# Patient Record
Sex: FEMALE | Race: OTHER | NOT HISPANIC OR LATINO | URBAN - METROPOLITAN AREA
[De-identification: names, ages, dates, MRNs, and addresses within clinical notes are randomized per-mention and may not be internally consistent; named-entity substitution may affect disease eponyms.]

---

## 2020-09-29 ENCOUNTER — EMERGENCY (EMERGENCY)
Facility: HOSPITAL | Age: 55
LOS: 1 days | Discharge: HOME | End: 2020-09-29
Attending: EMERGENCY MEDICINE | Admitting: HOSPITALIST
Payer: COMMERCIAL

## 2020-09-29 VITALS
RESPIRATION RATE: 16 BRPM | DIASTOLIC BLOOD PRESSURE: 108 MMHG | TEMPERATURE: 98 F | OXYGEN SATURATION: 100 % | SYSTOLIC BLOOD PRESSURE: 136 MMHG | HEART RATE: 86 BPM

## 2020-09-29 DIAGNOSIS — R21 RASH AND OTHER NONSPECIFIC SKIN ERUPTION: ICD-10-CM

## 2020-09-29 LAB
ALBUMIN SERPL ELPH-MCNC: 4.3 G/DL — SIGNIFICANT CHANGE UP (ref 3.3–5)
ALP SERPL-CCNC: 72 U/L — SIGNIFICANT CHANGE UP (ref 40–120)
ALT FLD-CCNC: 17 U/L — SIGNIFICANT CHANGE UP (ref 4–33)
ANION GAP SERPL CALC-SCNC: 10 MMO/L — SIGNIFICANT CHANGE UP (ref 7–14)
AST SERPL-CCNC: 16 U/L — SIGNIFICANT CHANGE UP (ref 4–32)
BASOPHILS # BLD AUTO: 0.05 K/UL — SIGNIFICANT CHANGE UP (ref 0–0.2)
BASOPHILS NFR BLD AUTO: 0.7 % — SIGNIFICANT CHANGE UP (ref 0–2)
BILIRUB SERPL-MCNC: 0.2 MG/DL — SIGNIFICANT CHANGE UP (ref 0.2–1.2)
BUN SERPL-MCNC: 13 MG/DL — SIGNIFICANT CHANGE UP (ref 7–23)
CALCIUM SERPL-MCNC: 9.8 MG/DL — SIGNIFICANT CHANGE UP (ref 8.4–10.5)
CHLORIDE SERPL-SCNC: 102 MMOL/L — SIGNIFICANT CHANGE UP (ref 98–107)
CO2 SERPL-SCNC: 28 MMOL/L — SIGNIFICANT CHANGE UP (ref 22–31)
CREAT SERPL-MCNC: 0.72 MG/DL — SIGNIFICANT CHANGE UP (ref 0.5–1.3)
CRP SERPL-MCNC: 5 MG/L — SIGNIFICANT CHANGE UP
EOSINOPHIL # BLD AUTO: 0.5 K/UL — SIGNIFICANT CHANGE UP (ref 0–0.5)
EOSINOPHIL NFR BLD AUTO: 7.1 % — HIGH (ref 0–6)
ERYTHROCYTE [SEDIMENTATION RATE] IN BLOOD: 14 MM/HR — SIGNIFICANT CHANGE UP (ref 4–25)
GLUCOSE SERPL-MCNC: 97 MG/DL — SIGNIFICANT CHANGE UP (ref 70–99)
HCT VFR BLD CALC: 42.5 % — SIGNIFICANT CHANGE UP (ref 34.5–45)
HGB BLD-MCNC: 13.6 G/DL — SIGNIFICANT CHANGE UP (ref 11.5–15.5)
IMM GRANULOCYTES NFR BLD AUTO: 0.1 % — SIGNIFICANT CHANGE UP (ref 0–1.5)
LYMPHOCYTES # BLD AUTO: 1.81 K/UL — SIGNIFICANT CHANGE UP (ref 1–3.3)
LYMPHOCYTES # BLD AUTO: 25.7 % — SIGNIFICANT CHANGE UP (ref 13–44)
MCHC RBC-ENTMCNC: 28.6 PG — SIGNIFICANT CHANGE UP (ref 27–34)
MCHC RBC-ENTMCNC: 32 % — SIGNIFICANT CHANGE UP (ref 32–36)
MCV RBC AUTO: 89.5 FL — SIGNIFICANT CHANGE UP (ref 80–100)
MONOCYTES # BLD AUTO: 0.36 K/UL — SIGNIFICANT CHANGE UP (ref 0–0.9)
MONOCYTES NFR BLD AUTO: 5.1 % — SIGNIFICANT CHANGE UP (ref 2–14)
NEUTROPHILS # BLD AUTO: 4.31 K/UL — SIGNIFICANT CHANGE UP (ref 1.8–7.4)
NEUTROPHILS NFR BLD AUTO: 61.3 % — SIGNIFICANT CHANGE UP (ref 43–77)
NRBC # FLD: 0 K/UL — SIGNIFICANT CHANGE UP (ref 0–0)
PLATELET # BLD AUTO: 196 K/UL — SIGNIFICANT CHANGE UP (ref 150–400)
PMV BLD: 10.9 FL — SIGNIFICANT CHANGE UP (ref 7–13)
POTASSIUM SERPL-MCNC: 4.2 MMOL/L — SIGNIFICANT CHANGE UP (ref 3.5–5.3)
POTASSIUM SERPL-SCNC: 4.2 MMOL/L — SIGNIFICANT CHANGE UP (ref 3.5–5.3)
PROT SERPL-MCNC: 7.4 G/DL — SIGNIFICANT CHANGE UP (ref 6–8.3)
RBC # BLD: 4.75 M/UL — SIGNIFICANT CHANGE UP (ref 3.8–5.2)
RBC # FLD: 13.1 % — SIGNIFICANT CHANGE UP (ref 10.3–14.5)
SODIUM SERPL-SCNC: 140 MMOL/L — SIGNIFICANT CHANGE UP (ref 135–145)
WBC # BLD: 7.04 K/UL — SIGNIFICANT CHANGE UP (ref 3.8–10.5)
WBC # FLD AUTO: 7.04 K/UL — SIGNIFICANT CHANGE UP (ref 3.8–10.5)

## 2020-09-29 PROCEDURE — 99283 EMERGENCY DEPT VISIT LOW MDM: CPT

## 2020-09-29 RX ORDER — ACETAMINOPHEN 500 MG
650 TABLET ORAL ONCE
Refills: 0 | Status: COMPLETED | OUTPATIENT
Start: 2020-09-29 | End: 2020-09-29

## 2020-09-29 RX ADMIN — Medication 650 MILLIGRAM(S): at 18:50

## 2020-09-29 NOTE — ED PROVIDER NOTE - PROGRESS NOTE DETAILS
Pt had been admitted and endorsed to hospitalist and MAR, but then chose to leave . She that she wants to come back tomorrow AM.  I informed her that her rash could potentially get worse and that she is an infectious risk.  She stated she has no immunocompromised close contacts and is able to isolate at home.  Understands and accepts risks of worsening including fevers, deformity, disability, death.  Is able to ensure adequate follow up. Appears to possess decisional capacity at this time.

## 2020-09-29 NOTE — ED PROVIDER NOTE - PHYSICAL EXAMINATION
Radha Jackman MD  GENERAL: Patient awake alert NAD.  HEENT: NC/AT, Moist mucous membranes, PERRL, EOMI.  LUNGS: CTAB, no wheezes or crackles.   CARDIAC: RRR, no m/r/g.    ABDOMEN: Soft, NT, ND, No rebound, guarding. No CVA tenderness.   EXT: No edema. No calf tenderness. CV 2+DP/PT bilaterally.   MSK: No spinal tenderness, no pain with movement, no deformities.  NEURO: A&Ox3. Moving all extremities.  SKIN: Warm and dry. +rash on face, ears, shoulders, back, R elbow, buttocks. Rash is vesicular in various stages -- macules, papules, vesicles, and crusted over vesicles. They are not on a red base. She has one small 2mm bullae on her elbow.  PSYCH: Normal affect. Radha Jackman MD  GENERAL: Patient awake alert NAD.  HEENT: NC/AT, Moist mucous membranes, PERRL, EOMI.  LUNGS: CTAB, no wheezes or crackles.   CARDIAC: RRR, no m/r/g.    ABDOMEN: Soft, NT, ND, No rebound, guarding. No CVA tenderness.   EXT: No edema. No calf tenderness. CV 2+DP/PT bilaterally.   MSK: No spinal tenderness, no pain with movement, no deformities.  NEURO: A&Ox3. Moving all extremities.  SKIN: Warm and dry. +rash on face, ears, shoulders, back, R elbow, buttocks. Rash is vesicular in various stages -- macules, papules, vesicles, and crusted over vesicles. They are not on a red base. She has one small 2mm pustule on her RIGHT elbow, near punctate biopsy site (done today at her derm).  PSYCH: Normal affect.

## 2020-09-29 NOTE — ED PROVIDER NOTE - OBJECTIVE STATEMENT
Pt is a 56 yo F who presents with 6 weeks of rash, acutely worsening over past couple weeks, sent in by her PCP concerned for disseminated zoster vs varicella.  Symptoms started 6 weeks ago with itching on the face. She was doing dry needling at the time and thought it was due to that. When symptoms got worse and rash started she went to pcp and was prescribed several creams which did not help. Rash has spread to shoulders, ears, back, buttocks, elbows. It starts as vesicles that then crust over. They are not painful. Her vision is not affected, nor hearing. Notes mild headache. Pt is a 54 yo F who presents with 6 weeks of pruritic rash, acutely worsening over past couple weeks, sent in by her Dermatologist, who has been trying to manage this, concerned for disseminated zoster vs primary varicella (does not know if she ever had chicken pox).  Symptoms started 6 weeks ago with itching on the face. She was doing dry needling at the time and thought it was due to that. When symptoms got worse and rash started she went to pcp, and then to derm, and was prescribed several creams which did not help. has copmleted 6 week course of Doxycycline. Rash has spread to shoulders, ears, back, buttocks, elbows. It starts as vesicles that then crust over. They are not painful. Her vision is not affected, nor hearing. Notes mild headache.    Derm = Dr. Gil Santiago [phone]

## 2020-09-29 NOTE — ED ADULT NURSE NOTE - OBJECTIVE STATEMENT
pt received at intake rm 12 AAO x 3. pt reports she was sent by her dermatologist for skin rash to her face, shoulders, and elbow. pt reports "rash" began several weeks ago. pt c/o itchiness and headache. pt noted to have bumps to face, ears, left shoulder, right elbow. bumps appear to be dry scabs, no drainage noted. pt denies sob, chest pain, n/v/d, fevers, chills, cough. respirations even and unlabored. 20g iv placed to left ac.

## 2020-09-29 NOTE — ED PROVIDER NOTE - CLINICAL SUMMARY MEDICAL DECISION MAKING FREE TEXT BOX
Augustina: pt has rash concerning for drug reaction vs disseminated zoster vs varicella. Pt has rash concerning for drug reaction vs disseminated zoster vs varicella.  Has failed outpt tx and eval by her Derm.  To be admitted for Derm and ID eval per my d/w her derm (we spoke with Derm office PA today).

## 2020-09-29 NOTE — ED PROVIDER NOTE - NS ED ROS FT
GENERAL: No fever, chills  EYES: no vision changes, no discharge.   ENT: no difficulty swallowing or speaking   CARDIAC: no chest pain/pressure, SOB, lower extremity swelling  PULMONARY: no cough, SOB  GI: no abdominal pain, n/v/d  : no dysuria  SKIN: +rash  NEURO: no headache, lightheadedness, paresthesia  MSK: No joint pain, myalgia, weakness.

## 2020-09-29 NOTE — ED ADULT TRIAGE NOTE - CHIEF COMPLAINT QUOTE
Pt c/o rash to face and back that has worsened over 6 weeks. Sent in by MD for vesicles tp B/L ears, face, back and R elbow as a r/o disseminated shingles v varicella. Pt reports she has a HA and is itchy

## 2020-09-29 NOTE — ED PROVIDER NOTE - NSFOLLOWUPINSTRUCTIONS_ED_ALL_ED_FT
YOU ARE LEAVING THE HOSPITAL AGAINST A RECOMMENDATION FOR ADMISSION    PLEASE RETURN AS SOON AS POSSIBLE TO CONTINUE EVALUATION SINCE YOUR RASH COULD GET MUCH WORSE AND LEAD TO DEFORMITY, DISABILITY, OR SERIOUS INFECTION. YOU WERE TO SEE AN INFECTIOUS DISEASES DOCTOR AND A DERMATOLOGIST TOMORROW.     AVOID PHYSICAL CONTACT WITH OTHERS AND, IN PARTICULAR, ISOLATE YOURSELF FROM PEOPLE WITH WEAKENED IMMUNE SYSTEMS SUCH AS THE VERY OLD OR VERY YOUNG OR THOSE ON CHEMOTHERAPY.

## 2020-09-29 NOTE — ED PROVIDER NOTE - ATTENDING CONTRIBUTION TO CARE
Attending Attestation: Dr. Jurado  I have personally performed a history and physical examination of the patient and discussed management with the resident as well as the patient.  I reviewed the resident's note and agree with the documented findings and plan of care.  I have authored and modified critical sections of the Provider Note, including but not limited to HPI, Physical Exam and MDM. Pt has rash concerning for drug reaction vs disseminated zoster vs varicella.  Has failed outpt tx and eval by her Derm.  To be admitted for Derm and ID eval per my d/w her derm (we spoke with Derm office PA today).

## 2020-09-29 NOTE — ED PROVIDER NOTE - PATIENT PORTAL LINK FT
You can access the FollowMyHealth Patient Portal offered by [facility] by registering at the following website: [email or web address]. By joining [employer]’s FollowMyHealth portal, you will also be able to view your health information using other applications (apps) compatible with our system.

## 2020-09-30 ENCOUNTER — EMERGENCY (EMERGENCY)
Facility: HOSPITAL | Age: 55
LOS: 1 days | Discharge: HOME | End: 2020-09-30
Attending: EMERGENCY MEDICINE | Admitting: EMERGENCY MEDICINE
Payer: COMMERCIAL

## 2020-09-30 VITALS
HEART RATE: 96 BPM | TEMPERATURE: 98 F | OXYGEN SATURATION: 100 % | SYSTOLIC BLOOD PRESSURE: 121 MMHG | DIASTOLIC BLOOD PRESSURE: 81 MMHG | RESPIRATION RATE: 16 BRPM

## 2020-09-30 LAB — SARS-COV-2 RNA SPEC QL NAA+PROBE: SIGNIFICANT CHANGE UP

## 2020-09-30 PROCEDURE — 99244 OFF/OP CNSLTJ NEW/EST MOD 40: CPT | Mod: GC

## 2020-09-30 PROCEDURE — 99283 EMERGENCY DEPT VISIT LOW MDM: CPT

## 2020-09-30 NOTE — ED PROVIDER NOTE - PHYSICAL EXAMINATION
GENERAL: Patient awake alert NAD.  NEURO: A&Ox3. Moving all extremities.  SKIN: Vesicular rashes in multiple stages of healing that are crusted over on b/l upper back, shoulders, periorbital regions, right ear, b/l cheeks, b/l buttocks.  PSYCH: Normal affect.

## 2020-09-30 NOTE — ED ADULT TRIAGE NOTE - CHIEF COMPLAINT QUOTE
Pt c/o disseminated rash to upper back and face. States she was here yesterday and was "told to come back to ER today to see an infectious disease doctor". Denies CP, SOB, fever.

## 2020-09-30 NOTE — ED PROVIDER NOTE - NS ED ROS FT
General: denies fever, chills, weight loss/weight gain.  HENT: denies nasal congestion, sore throat, rhinorrhea, ear pain.  Eyes: denies visual changes, blurred vision, eye discharge, eye redness.  Neck: denies neck pain, neck swelling.  CV: denies chest pain, palpitations.  Resp: denies difficulty breathing, cough.  Abdominal: denies nausea, vomiting, diarrhea, abdominal pain, blood in stool, dark stool.  MSK: denies muscle aches, bony pain, leg pain, leg swelling.  Neuro: denies headaches, numbness, tingling, dizziness, lightheadedness.  Skin: rashes in face, shoulders, upper back, buttocks.  Hematologic: denies unexplained bruises.

## 2020-09-30 NOTE — ED PROVIDER NOTE - OBJECTIVE STATEMENT
Pt. is a 55 y.o. F p/w 6 weeks of rash on her face, upper back, b/l buttocks, right shoulder. Pt. has been following a dermatologist outpt. and has tried multiple different meds and creams which have not helped.  Pt. had a bx of one of the lesions yesterday, after which her dermatologist sent her in for concern for disseminated zoster.  Denies ever having chicken pox, non-vaccinated for varicella.  Rash is itchy.  Denies vision/hearing changes.    Derm = Dr. Gil Santiago [phone]

## 2020-09-30 NOTE — CONSULT NOTE ADULT - SUBJECTIVE AND OBJECTIVE BOX
Lily Porter - [phone]    Patient is a 55y old  Female who presents with a chief complaint of rash    HPI:  · HPI Objective Statement: Pt. is a 55 y.o. F p/w 6 weeks of rash on her face, upper back, b/l buttocks, right shoulder. Pt. has been following a dermatologist outpt. and has tried multiple different meds and creams which have not helped.  Pt. had a bx of one of the lesions yesterday, after which her dermatologist sent her in for concern for disseminated zoster.  Denies ever having chicken pox, non-vaccinated for varicella.  Rash is itchy.  Denies vision/hearing changes.    	Derm = Dr. Gil Santiago [phone]           prior hospital charts reviewed [  ]  primary team notes reviewed [  ]  other consultant notes reviewed [  ]    PAST MEDICAL & SURGICAL HISTORY:      Allergies  penicillin (Anaphylaxis)    ANTIMICROBIALS:          OTHER MEDS: MEDICATIONS  (STANDING):    SOCIAL HISTORY:       FAMILY HISTORY:    REVIEW OF SYSTEMS  [  ] ROS unobtainable because:    [  ] All other systems negative except as noted below:	    Constitutional:  [ ] fever [ ] chills  [ ] weight loss  [ ] weakness  Skin:  [ ] rash [ ] phlebitis	  Eyes: [ ] icterus [ ] pain  [ ] discharge	  ENMT: [ ] sore throat  [ ] thrush [ ] ulcers [ ] exudates  Respiratory: [ ] dyspnea [ ] hemoptysis [ ] cough [ ] sputum	  Cardiovascular:  [ ] chest pain [ ] palpitations [ ] edema	  Gastrointestinal:  [ ] nausea [ ] vomiting [ ] diarrhea [ ] constipation [ ] pain	  Genitourinary:  [ ] dysuria [ ] frequency [ ] hematuria [ ] discharge [ ] flank pain  [ ] incontinence  Musculoskeletal:  [ ] myalgias [ ] arthralgias [ ] arthritis  [ ] back pain  Neurological:  [ ] headache [ ] seizures  [ ] confusion/altered mental status  Psychiatric:  [ ] anxiety [ ] depression	  Hematology/Lymphatics:  [ ] lymphadenopathy  Endocrine:  [ ] adrenal [ ] thyroid  Allergic/Immunologic:	 [ ] transplant [ ] seasonal    Vital Signs Last 24 Hrs  T(F): 97.7 (09-30-20 @ 13:45), Max: 98 (09-29-20 @ 15:36)  Vital Signs Last 24 Hrs  HR: 96 (09-30-20 @ 13:45) (96 - 96)  BP: 121/81 (09-30-20 @ 13:45) (121/81 - 121/81)  RR: 16 (09-30-20 @ 13:45)  SpO2: 100% (09-30-20 @ 13:45) (100% - 100%)  Wt(kg): --    PHYSICAL EXAM:                              13.6   7.04  )-----------( 196      ( 29 Sep 2020 20:00 )             42.5     140  |  102  |  13  ----------------------------<  97  4.2   |  28  |  0.72    Ca    9.8      29 Sep 2020 20:00    TPro  7.4  /  Alb  4.3  /  TBili  0.2  /  DBili  x   /  AST  16  /  ALT  17  /  AlkPhos  72  09-29    MICROBIOLOGY:    RADIOLOGY:  imaging below personally reviewed and agree with findings Lily Porter - [phone]    Patient is a 55y old  Female who presents with a chief complaint of rash    HPI:  55F p/w 6 weeks of rash on her face, upper back, b/l buttocks, right shoulder. Pt. has been following a dermatologist outpt.  She has been on doxycycline and topical steroids without relief.  Biggest complaint is pruritis.  She did have an outpatient biopsy but does not know the results.      ID asked to help management but mostly to see if we think this is zoster.    prior hospital charts reviewed [ x ]  primary team notes reviewed [  ]  other consultant notes reviewed [  ]    PAST MEDICAL & SURGICAL HISTORY:  none    Allergies  penicillin (Anaphylaxis)    ANTIMICROBIALS:  hx doxycycline    MEDS:  none    SOCIAL HISTORY:   no tobacco, not currently working, was in banking    FAMILY HISTORY:  no autoimmune disease    REVIEW OF SYSTEMS  [  ] ROS unobtainable because:    [ x ] All other systems negative except as noted below:	    Constitutional:  [ ] fever [ ] chills  [ ] weight loss  [ ] weakness  Skin:  [ x] rash [ ] phlebitis	  Eyes: [ ] icterus [ ] pain  [ ] discharge	  ENMT: [ ] sore throat  [ ] thrush [ ] ulcers [ ] exudates  Respiratory: [ ] dyspnea [ ] hemoptysis [ ] cough [ ] sputum	  Cardiovascular:  [ ] chest pain [ ] palpitations [ ] edema	  Gastrointestinal:  [ ] nausea [ ] vomiting [ ] diarrhea [ ] constipation [ ] pain	  Genitourinary:  [ ] dysuria [ ] frequency [ ] hematuria [ ] discharge [ ] flank pain  [ ] incontinence  Musculoskeletal:  [ ] myalgias [ ] arthralgias [ ] arthritis  [ ] back pain  Neurological:  [ ] headache [ ] seizures  [ ] confusion/altered mental status  Psychiatric:  [ ] anxiety [ ] depression	  Hematology/Lymphatics:  [ ] lymphadenopathy  Endocrine:  [ ] adrenal [ ] thyroid  Allergic/Immunologic:	 [ ] transplant [ ] seasonal    Vital Signs Last 24 Hrs  T(F): 97.7 (09-30-20 @ 13:45), Max: 98 (09-29-20 @ 15:36)  Vital Signs Last 24 Hrs  HR: 96 (09-30-20 @ 13:45) (96 - 96)  BP: 121/81 (09-30-20 @ 13:45) (121/81 - 121/81)  RR: 16 (09-30-20 @ 13:45)  SpO2: 100% (09-30-20 @ 13:45) (100% - 100%)  Wt(kg): --    PHYSICAL EXAM:  Constitutional: non-toxic,  HEAD/EYES: anicteric  ENT:  supple, no thrush  Cardiovascular:   not tachycardic  Respiratory:  not tachypneic  GI:  soft, non-tender, normal bowel sounds  :  no haq  Musculoskeletal:  no synovitis  Neurologic: awake and alert  Skin:  mostly excoriated areas of skin, some hyperkeratotic areas elbow, patches of blisters left posterior shoulder  Psychiatric:  awake, alert, appropriate mood    Auto Eosinophil %: 7.1 % (09-29-20 @ 20:00)                       13.6   7.04  )-----------( 196      ( 29 Sep 2020 20:00 )             42.5     140  |  102  |  13  ----------------------------<  97  4.2   |  28  |  0.72    Ca    9.8      29 Sep 2020 20:00    TPro  7.4  /  Alb  4.3  /  TBili  0.2  /  DBili  x   /  AST  16  /  ALT  17  /  AlkPhos  72  09-29    MICROBIOLOGY:    RADIOLOGY:  imaging below personally reviewed and agree with findings

## 2020-09-30 NOTE — ED PROVIDER NOTE - ATTENDING CONTRIBUTION TO CARE
DR. BLOCH, ATTENDING MD-  I performed a face to face bedside interview with patient regarding history of present illness, review of symptoms and past medical history. I completed an independent physical exam.  I have discussed patient's plan of care with the resident.  Patient with lesions on face, posterior axilla an bilateral buttocks, symmetrical, no active blisters. no adenopathy, heart sounds nml lungs clear,abd soft no HSM

## 2020-09-30 NOTE — ED PROVIDER NOTE - CLINICAL SUMMARY MEDICAL DECISION MAKING FREE TEXT BOX
Pt. is a 55 y.o. F p/w multiple rashes in various stages of healing that are crusted over on b/l cheeks, periorbital region, on ears, b/l shoulders, b/l upper back.  Pt. did not have chicken pox as a child, unvaccinated for VZV.  Pt. is not immunocompromised.  Treatment unlikely to be initiated since lesions crusted over.  Will get ID consult and dispo pending their results.

## 2020-09-30 NOTE — ED PROVIDER NOTE - PROGRESS NOTE DETAILS
Darryl PGY2 - Pt. seen by ID.  They do not believe this is VZV.  Pt. reportedly got bx results back today which stated this isn't VZV either.  Will d/c w/ outpt. derm f/u.  Pt. aware and agrees w/ plan.  All other questions and concerns have been addressed.

## 2020-09-30 NOTE — ED PROVIDER NOTE - NSFOLLOWUPINSTRUCTIONS_ED_ALL_ED_FT
You were seen for the skin rash.    The ID doctors do not believe this is Shingles.  Please follow up the results from your skin biopsy and follow up with your dermatologist.    If you have any concerning symptoms, please seek immediate medical attention.

## 2020-09-30 NOTE — ED PROVIDER NOTE - PATIENT PORTAL LINK FT
You can access the FollowMyHealth Patient Portal offered by [facility] by registering at the following website: [email or web address]. By joining [employer]’s FollowMyHealth portal, you will also be able to view your health information using other applications (apps) compatible with our system.

## 2020-09-30 NOTE — CONSULT NOTE ADULT - ASSESSMENT
55F with chronic pruritis and exocirations. some blistering rash posterior shoulder that does not appear like HSV/VZV.  Primary dermatologic process most likely or allergic rash (increased eos) or autoimmune etiology.  No role for antibiotics at this time.  Would await dermatology recommendations.    Please call Infectious Diseases if there is a change in status.  Thank you.  [phone]. 55F with chronic pruritis and exocirations. some blistering rash posterior shoulder that does not appear like HSV/VZV.  Primary dermatologic process most likely or allergic rash (increased eos) or autoimmune etiology.  No role for antibiotics at this time.  Would await dermatology recommendations.  Would check thyroid tests, CASPER    Please call Infectious Diseases if there is a change in status.  Thank you.  [phone].

## 2022-08-12 ENCOUNTER — INPATIENT (INPATIENT)
Facility: HOSPITAL | Age: 57
LOS: 0 days | Discharge: HOME | DRG: 379 | End: 2022-08-13
Attending: INTERNAL MEDICINE | Admitting: INTERNAL MEDICINE
Payer: COMMERCIAL

## 2022-08-12 VITALS
SYSTOLIC BLOOD PRESSURE: 122 MMHG | HEART RATE: 85 BPM | RESPIRATION RATE: 18 BRPM | OXYGEN SATURATION: 99 % | DIASTOLIC BLOOD PRESSURE: 82 MMHG | TEMPERATURE: 98 F | HEIGHT: 63 IN | WEIGHT: 139.99 LBS

## 2022-08-12 PROCEDURE — 99285 EMERGENCY DEPT VISIT HI MDM: CPT

## 2022-08-13 ENCOUNTER — TRANSCRIPTION ENCOUNTER (OUTPATIENT)
Age: 57
End: 2022-08-13

## 2022-08-13 VITALS
OXYGEN SATURATION: 97 % | TEMPERATURE: 98 F | HEART RATE: 73 BPM | SYSTOLIC BLOOD PRESSURE: 125 MMHG | DIASTOLIC BLOOD PRESSURE: 84 MMHG | RESPIRATION RATE: 18 BRPM

## 2022-08-13 DIAGNOSIS — Z29.9 ENCOUNTER FOR PROPHYLACTIC MEASURES, UNSPECIFIED: ICD-10-CM

## 2022-08-13 DIAGNOSIS — E03.9 HYPOTHYROIDISM, UNSPECIFIED: ICD-10-CM

## 2022-08-13 DIAGNOSIS — K92.2 GASTROINTESTINAL HEMORRHAGE, UNSPECIFIED: ICD-10-CM

## 2022-08-13 PROBLEM — Z78.9 OTHER SPECIFIED HEALTH STATUS: Chronic | Status: ACTIVE | Noted: 2020-09-29

## 2022-08-13 LAB
ALBUMIN SERPL ELPH-MCNC: 3.6 G/DL — SIGNIFICANT CHANGE UP (ref 3.5–5)
ALBUMIN SERPL ELPH-MCNC: 3.7 G/DL — SIGNIFICANT CHANGE UP (ref 3.5–5)
ALP SERPL-CCNC: 86 U/L — SIGNIFICANT CHANGE UP (ref 40–120)
ALP SERPL-CCNC: 89 U/L — SIGNIFICANT CHANGE UP (ref 40–120)
ALT FLD-CCNC: 21 U/L DA — SIGNIFICANT CHANGE UP (ref 10–60)
ALT FLD-CCNC: 23 U/L DA — SIGNIFICANT CHANGE UP (ref 10–60)
ANION GAP SERPL CALC-SCNC: 6 MMOL/L — SIGNIFICANT CHANGE UP (ref 5–17)
ANION GAP SERPL CALC-SCNC: 8 MMOL/L — SIGNIFICANT CHANGE UP (ref 5–17)
APPEARANCE UR: CLEAR — SIGNIFICANT CHANGE UP
APTT BLD: 32.5 SEC — SIGNIFICANT CHANGE UP (ref 27.5–35.5)
AST SERPL-CCNC: 12 U/L — SIGNIFICANT CHANGE UP (ref 10–40)
AST SERPL-CCNC: 16 U/L — SIGNIFICANT CHANGE UP (ref 10–40)
BASOPHILS # BLD AUTO: 0.05 K/UL — SIGNIFICANT CHANGE UP (ref 0–0.2)
BASOPHILS NFR BLD AUTO: 0.6 % — SIGNIFICANT CHANGE UP (ref 0–2)
BILIRUB SERPL-MCNC: 0.2 MG/DL — SIGNIFICANT CHANGE UP (ref 0.2–1.2)
BILIRUB SERPL-MCNC: 0.4 MG/DL — SIGNIFICANT CHANGE UP (ref 0.2–1.2)
BILIRUB UR-MCNC: NEGATIVE — SIGNIFICANT CHANGE UP
BUN SERPL-MCNC: 14 MG/DL — SIGNIFICANT CHANGE UP (ref 7–18)
BUN SERPL-MCNC: 17 MG/DL — SIGNIFICANT CHANGE UP (ref 7–18)
CALCIUM SERPL-MCNC: 8.9 MG/DL — SIGNIFICANT CHANGE UP (ref 8.4–10.5)
CALCIUM SERPL-MCNC: 9.2 MG/DL — SIGNIFICANT CHANGE UP (ref 8.4–10.5)
CHLORIDE SERPL-SCNC: 108 MMOL/L — SIGNIFICANT CHANGE UP (ref 96–108)
CHLORIDE SERPL-SCNC: 110 MMOL/L — HIGH (ref 96–108)
CHOLEST SERPL-MCNC: 233 MG/DL — HIGH
CO2 SERPL-SCNC: 27 MMOL/L — SIGNIFICANT CHANGE UP (ref 22–31)
CO2 SERPL-SCNC: 28 MMOL/L — SIGNIFICANT CHANGE UP (ref 22–31)
COLOR SPEC: YELLOW — SIGNIFICANT CHANGE UP
CREAT SERPL-MCNC: 0.84 MG/DL — SIGNIFICANT CHANGE UP (ref 0.5–1.3)
CREAT SERPL-MCNC: 0.86 MG/DL — SIGNIFICANT CHANGE UP (ref 0.5–1.3)
CULTURE RESULTS: SIGNIFICANT CHANGE UP
DIFF PNL FLD: ABNORMAL
EGFR: 80 ML/MIN/1.73M2 — SIGNIFICANT CHANGE UP
EGFR: 83 ML/MIN/1.73M2 — SIGNIFICANT CHANGE UP
EOSINOPHIL # BLD AUTO: 0.36 K/UL — SIGNIFICANT CHANGE UP (ref 0–0.5)
EOSINOPHIL NFR BLD AUTO: 4.7 % — SIGNIFICANT CHANGE UP (ref 0–6)
GLUCOSE SERPL-MCNC: 100 MG/DL — HIGH (ref 70–99)
GLUCOSE SERPL-MCNC: 95 MG/DL — SIGNIFICANT CHANGE UP (ref 70–99)
GLUCOSE UR QL: NEGATIVE — SIGNIFICANT CHANGE UP
HCG SERPL-ACNC: 6 MIU/ML — SIGNIFICANT CHANGE UP
HCT VFR BLD CALC: 41.1 % — SIGNIFICANT CHANGE UP (ref 34.5–45)
HCT VFR BLD CALC: 41.6 % — SIGNIFICANT CHANGE UP (ref 34.5–45)
HDLC SERPL-MCNC: 40 MG/DL — LOW
HGB BLD-MCNC: 13.3 G/DL — SIGNIFICANT CHANGE UP (ref 11.5–15.5)
HGB BLD-MCNC: 13.4 G/DL — SIGNIFICANT CHANGE UP (ref 11.5–15.5)
IMM GRANULOCYTES NFR BLD AUTO: 0.3 % — SIGNIFICANT CHANGE UP (ref 0–1.5)
INR BLD: 1.02 RATIO — SIGNIFICANT CHANGE UP (ref 0.88–1.16)
KETONES UR-MCNC: NEGATIVE — SIGNIFICANT CHANGE UP
LEUKOCYTE ESTERASE UR-ACNC: ABNORMAL
LIDOCAIN IGE QN: 332 U/L — SIGNIFICANT CHANGE UP (ref 73–393)
LIPID PNL WITH DIRECT LDL SERPL: 170 MG/DL — HIGH
LYMPHOCYTES # BLD AUTO: 2.47 K/UL — SIGNIFICANT CHANGE UP (ref 1–3.3)
LYMPHOCYTES # BLD AUTO: 32 % — SIGNIFICANT CHANGE UP (ref 13–44)
MAGNESIUM SERPL-MCNC: 2.3 MG/DL — SIGNIFICANT CHANGE UP (ref 1.6–2.6)
MCHC RBC-ENTMCNC: 28.9 PG — SIGNIFICANT CHANGE UP (ref 27–34)
MCHC RBC-ENTMCNC: 29.8 PG — SIGNIFICANT CHANGE UP (ref 27–34)
MCHC RBC-ENTMCNC: 32 GM/DL — SIGNIFICANT CHANGE UP (ref 32–36)
MCHC RBC-ENTMCNC: 32.6 GM/DL — SIGNIFICANT CHANGE UP (ref 32–36)
MCV RBC AUTO: 90.2 FL — SIGNIFICANT CHANGE UP (ref 80–100)
MCV RBC AUTO: 91.3 FL — SIGNIFICANT CHANGE UP (ref 80–100)
MONOCYTES # BLD AUTO: 0.52 K/UL — SIGNIFICANT CHANGE UP (ref 0–0.9)
MONOCYTES NFR BLD AUTO: 6.7 % — SIGNIFICANT CHANGE UP (ref 2–14)
NEUTROPHILS # BLD AUTO: 4.31 K/UL — SIGNIFICANT CHANGE UP (ref 1.8–7.4)
NEUTROPHILS NFR BLD AUTO: 55.7 % — SIGNIFICANT CHANGE UP (ref 43–77)
NITRITE UR-MCNC: NEGATIVE — SIGNIFICANT CHANGE UP
NON HDL CHOLESTEROL: 193 MG/DL — HIGH
NRBC # BLD: 0 /100 WBCS — SIGNIFICANT CHANGE UP (ref 0–0)
NRBC # BLD: 0 /100 WBCS — SIGNIFICANT CHANGE UP (ref 0–0)
PH UR: 6 — SIGNIFICANT CHANGE UP (ref 5–8)
PHOSPHATE SERPL-MCNC: 4 MG/DL — SIGNIFICANT CHANGE UP (ref 2.5–4.5)
PLATELET # BLD AUTO: 182 K/UL — SIGNIFICANT CHANGE UP (ref 150–400)
PLATELET # BLD AUTO: 202 K/UL — SIGNIFICANT CHANGE UP (ref 150–400)
POTASSIUM SERPL-MCNC: 3.6 MMOL/L — SIGNIFICANT CHANGE UP (ref 3.5–5.3)
POTASSIUM SERPL-MCNC: 4.4 MMOL/L — SIGNIFICANT CHANGE UP (ref 3.5–5.3)
POTASSIUM SERPL-SCNC: 3.6 MMOL/L — SIGNIFICANT CHANGE UP (ref 3.5–5.3)
POTASSIUM SERPL-SCNC: 4.4 MMOL/L — SIGNIFICANT CHANGE UP (ref 3.5–5.3)
PROT SERPL-MCNC: 7.5 G/DL — SIGNIFICANT CHANGE UP (ref 6–8.3)
PROT SERPL-MCNC: 7.6 G/DL — SIGNIFICANT CHANGE UP (ref 6–8.3)
PROT UR-MCNC: 15
PROTHROM AB SERPL-ACNC: 12.2 SEC — SIGNIFICANT CHANGE UP (ref 10.5–13.4)
RBC # BLD: 4.5 M/UL — SIGNIFICANT CHANGE UP (ref 3.8–5.2)
RBC # BLD: 4.61 M/UL — SIGNIFICANT CHANGE UP (ref 3.8–5.2)
RBC # FLD: 12.8 % — SIGNIFICANT CHANGE UP (ref 10.3–14.5)
RBC # FLD: 13 % — SIGNIFICANT CHANGE UP (ref 10.3–14.5)
SARS-COV-2 RNA SPEC QL NAA+PROBE: SIGNIFICANT CHANGE UP
SODIUM SERPL-SCNC: 143 MMOL/L — SIGNIFICANT CHANGE UP (ref 135–145)
SODIUM SERPL-SCNC: 144 MMOL/L — SIGNIFICANT CHANGE UP (ref 135–145)
SP GR SPEC: 1.02 — SIGNIFICANT CHANGE UP (ref 1.01–1.02)
SPECIMEN SOURCE: SIGNIFICANT CHANGE UP
TRIGL SERPL-MCNC: 117 MG/DL — SIGNIFICANT CHANGE UP
UROBILINOGEN FLD QL: NEGATIVE — SIGNIFICANT CHANGE UP
WBC # BLD: 5.74 K/UL — SIGNIFICANT CHANGE UP (ref 3.8–10.5)
WBC # BLD: 7.73 K/UL — SIGNIFICANT CHANGE UP (ref 3.8–10.5)
WBC # FLD AUTO: 5.74 K/UL — SIGNIFICANT CHANGE UP (ref 3.8–10.5)
WBC # FLD AUTO: 7.73 K/UL — SIGNIFICANT CHANGE UP (ref 3.8–10.5)

## 2022-08-13 PROCEDURE — 71045 X-RAY EXAM CHEST 1 VIEW: CPT

## 2022-08-13 PROCEDURE — 87086 URINE CULTURE/COLONY COUNT: CPT

## 2022-08-13 PROCEDURE — 80053 COMPREHEN METABOLIC PANEL: CPT

## 2022-08-13 PROCEDURE — 84100 ASSAY OF PHOSPHORUS: CPT

## 2022-08-13 PROCEDURE — 81001 URINALYSIS AUTO W/SCOPE: CPT

## 2022-08-13 PROCEDURE — 87635 SARS-COV-2 COVID-19 AMP PRB: CPT

## 2022-08-13 PROCEDURE — 84702 CHORIONIC GONADOTROPIN TEST: CPT

## 2022-08-13 PROCEDURE — 83690 ASSAY OF LIPASE: CPT

## 2022-08-13 PROCEDURE — 86901 BLOOD TYPING SEROLOGIC RH(D): CPT

## 2022-08-13 PROCEDURE — 93005 ELECTROCARDIOGRAM TRACING: CPT

## 2022-08-13 PROCEDURE — 96374 THER/PROPH/DIAG INJ IV PUSH: CPT

## 2022-08-13 PROCEDURE — 85610 PROTHROMBIN TIME: CPT

## 2022-08-13 PROCEDURE — 99285 EMERGENCY DEPT VISIT HI MDM: CPT | Mod: 25

## 2022-08-13 PROCEDURE — 85025 COMPLETE CBC W/AUTO DIFF WBC: CPT

## 2022-08-13 PROCEDURE — 85027 COMPLETE CBC AUTOMATED: CPT

## 2022-08-13 PROCEDURE — 80061 LIPID PANEL: CPT

## 2022-08-13 PROCEDURE — 36415 COLL VENOUS BLD VENIPUNCTURE: CPT

## 2022-08-13 PROCEDURE — 83735 ASSAY OF MAGNESIUM: CPT

## 2022-08-13 PROCEDURE — 85730 THROMBOPLASTIN TIME PARTIAL: CPT

## 2022-08-13 PROCEDURE — 12345: CPT | Mod: NC

## 2022-08-13 PROCEDURE — 86850 RBC ANTIBODY SCREEN: CPT

## 2022-08-13 PROCEDURE — 86900 BLOOD TYPING SEROLOGIC ABO: CPT

## 2022-08-13 PROCEDURE — 71045 X-RAY EXAM CHEST 1 VIEW: CPT | Mod: 26

## 2022-08-13 RX ORDER — ACETAMINOPHEN 500 MG
650 TABLET ORAL EVERY 6 HOURS
Refills: 0 | Status: DISCONTINUED | OUTPATIENT
Start: 2022-08-13 | End: 2022-08-13

## 2022-08-13 RX ORDER — LEVOTHYROXINE SODIUM 125 MCG
50 TABLET ORAL DAILY
Refills: 0 | Status: DISCONTINUED | OUTPATIENT
Start: 2022-08-13 | End: 2022-08-13

## 2022-08-13 RX ORDER — SERTRALINE 25 MG/1
0 TABLET, FILM COATED ORAL
Qty: 0 | Refills: 0 | DISCHARGE

## 2022-08-13 RX ORDER — PANTOPRAZOLE SODIUM 20 MG/1
1 TABLET, DELAYED RELEASE ORAL
Qty: 60 | Refills: 0
Start: 2022-08-13 | End: 2022-09-11

## 2022-08-13 RX ORDER — PANTOPRAZOLE SODIUM 20 MG/1
80 TABLET, DELAYED RELEASE ORAL ONCE
Refills: 0 | Status: COMPLETED | OUTPATIENT
Start: 2022-08-13 | End: 2022-08-13

## 2022-08-13 RX ORDER — PANTOPRAZOLE SODIUM 20 MG/1
40 TABLET, DELAYED RELEASE ORAL
Refills: 0 | Status: DISCONTINUED | OUTPATIENT
Start: 2022-08-13 | End: 2022-08-13

## 2022-08-13 RX ORDER — ONDANSETRON 8 MG/1
4 TABLET, FILM COATED ORAL EVERY 8 HOURS
Refills: 0 | Status: DISCONTINUED | OUTPATIENT
Start: 2022-08-13 | End: 2022-08-13

## 2022-08-13 RX ORDER — LEVOTHYROXINE SODIUM 125 MCG
0 TABLET ORAL
Qty: 0 | Refills: 0 | DISCHARGE

## 2022-08-13 RX ORDER — LANOLIN ALCOHOL/MO/W.PET/CERES
3 CREAM (GRAM) TOPICAL AT BEDTIME
Refills: 0 | Status: DISCONTINUED | OUTPATIENT
Start: 2022-08-13 | End: 2022-08-13

## 2022-08-13 RX ORDER — SERTRALINE 25 MG/1
100 TABLET, FILM COATED ORAL DAILY
Refills: 0 | Status: DISCONTINUED | OUTPATIENT
Start: 2022-08-13 | End: 2022-08-13

## 2022-08-13 RX ADMIN — Medication 50 MICROGRAM(S): at 08:59

## 2022-08-13 RX ADMIN — SERTRALINE 100 MILLIGRAM(S): 25 TABLET, FILM COATED ORAL at 11:35

## 2022-08-13 RX ADMIN — PANTOPRAZOLE SODIUM 80 MILLIGRAM(S): 20 TABLET, DELAYED RELEASE ORAL at 02:03

## 2022-08-13 NOTE — DISCHARGE NOTE PROVIDER - NSDCFUADDINST_GEN_ALL_CORE_FT
Gluten free Gluten free diet  Follow up with GI as advised Gluten free diet  Follow up with GI as advised; You may follow up with your Gastro or Dr. Stewart Granado next week as advised; if unable to get appointment you may follow up with our Gastro Dr. Hager at [address] [place] [address] as an alternate.   Repeat blood count with your PCP in one week

## 2022-08-13 NOTE — ED PROVIDER NOTE - OBJECTIVE STATEMENT
56 year old female PMH sjrogens syndrome coming in with 3 weeks of worsening black stools and SOB. went to her PMD on the 10th of this months and had blood work done and was then told to come to the ED for evaluation. never had this happen before. doesn't take iron pills. states SOB worsening. last colonoscopy was about 1 year ago which was normal and endoscopy was between 3-5 years ago and was normal. denies all other complaints.

## 2022-08-13 NOTE — H&P ADULT - NSHPPHYSICALEXAM_GEN_ALL_CORE
Vital Signs Last 24 Hrs  T(C): 36.4 (12 Aug 2022 23:01), Max: 36.4 (12 Aug 2022 23:01)  T(F): 97.5 (12 Aug 2022 23:01), Max: 97.5 (12 Aug 2022 23:01)  HR: 85 (12 Aug 2022 23:01) (85 - 85)  BP: 122/82 (12 Aug 2022 23:01) (122/82 - 122/82)  BP(mean): --  RR: 18 (12 Aug 2022 23:01) (18 - 18)  SpO2: 99% (12 Aug 2022 23:01) (99% - 99%)    Parameters below as of 12 Aug 2022 23:01  Patient On (Oxygen Delivery Method): room air    PHYSICAL EXAM:  GENERAL: NAD, speaks in full sentences, no signs of respiratory distress  HEAD:  Atraumatic, Normocephalic  EYES: EOMI, PERRLA, conjunctiva and sclera clear  NECK: Supple, No JVD  CHEST/LUNG: Clear to auscultation bilaterally; No wheeze; No crackles; No accessory muscles used  HEART: Regular rate and rhythm; No murmurs;   ABDOMEN: Soft, Nontender, Nondistended; Bowel sounds present; No guarding  EXTREMITIES:  2+ Peripheral Pulses, No cyanosis or edema  PSYCH: AAOx3  NEUROLOGY: non-focal  SKIN: No rashes or lesions

## 2022-08-13 NOTE — H&P ADULT - ASSESSMENT
This is a 56 year old female with medical history of hypothyroid coming in with concerns for GI bleed.

## 2022-08-13 NOTE — DISCHARGE NOTE NURSING/CASE MANAGEMENT/SOCIAL WORK - NSDCPEFALRISK_GEN_ALL_CORE
For information on Fall & Injury Prevention, visit: [email or web address] OR  [email or web address] OR  https://www.cdc.gov/steadi/patient.html

## 2022-08-13 NOTE — H&P ADULT - PROBLEM SELECTOR PLAN 1
p/w melena  protonix 40 bid  CBC q8  transfuse upRBC  HB goal >7 / 8 as pt w/ CAD  GI: Dr. GALARZAX consulted. p/w melena  protonix 40 bid  CBC q12  transfuse upRBC  HB goal >7   GI: Dr. Granado consulted.

## 2022-08-13 NOTE — DISCHARGE NOTE PROVIDER - HOSPITAL COURSE
This is a 56 year old female with medical history of hypothyroid, depression coming in with black stools for 1 month. She states she has been having black stool along with shortness of breath for the last 3 weeks. She was told by her PCP today to come to the ED. She states the black stool occur daily but not more than 2x a day. They are fully formed mahendra. She also has been shortness of breath for similar amount of time. She denies any headaches, visual disturbances, N/V/D, dizziness, falls, chest pain, palpitations, lower extremity swelling, fevers, skin rash, recent travel, or sick contacts.  She does admit to eating a spicy diet and eating gluten one time and thought this may have triggered her black stools.  She was told she is gluten intolerant but does not remember the exact name of the diagnosis she was given. She had a colonoscopy about a year ago and reportedly there were no issues.    On arrival her vitals were stable, her Hgb on arrival was 13.4.  Repeat hemoglobin was 13.3. Other labs were unremarkable.  She had no bowel movements during her hospitalization so no fecal occult blood was collected. Her shortness of breath had resolved.     She was started on protonix 40mg BID and stated any discomfort she had in her abdomen had resolved.  We spoke to Gastroenterology and given her stable hemoglobin she was deemed stable for discharge with outpatient follow-up.    Please note this is only a summary, refer to the full chart for further details.

## 2022-08-13 NOTE — H&P ADULT - ATTENDING COMMENTS
Discussed with MAR PGY2 Dr. Rosario.    This is a 57 y/o female presenting with black stools for the past several weeks. Last colonoscopy was 1 year ago and unremarkable per patient. She reports having had an unremarkable EGD "a few years ago" as well. Otherwise no complaints. Will admit to medicine for concern of melena.    Vital Signs Last 24 Hrs  T(C): 36.4 (12 Aug 2022 23:01), Max: 36.4 (12 Aug 2022 23:01)  T(F): 97.5 (12 Aug 2022 23:01), Max: 97.5 (12 Aug 2022 23:01)  HR: 85 (12 Aug 2022 23:01) (85 - 85)  BP: 122/82 (12 Aug 2022 23:01) (122/82 - 122/82)  BP(mean): --  RR: 18 (12 Aug 2022 23:01) (18 - 18)  SpO2: 99% (12 Aug 2022 23:01) (99% - 99%)    Parameters below as of 12 Aug 2022 23:01  Patient On (Oxygen Delivery Method): room air    PEx  as above    #Melena  #Hypothyroidism    - admit to medicine  - monitor cbc q12 hours as she does not seem to be actively bleeding at present  - IV PPI bid  - GI consult  - clear liquid diet

## 2022-08-13 NOTE — DISCHARGE NOTE PROVIDER - NPI NUMBER (FOR SYSADMIN USE ONLY) :
[[id number]],[[id number] [[id number]],[[id number]],[[id number]] [[id number]],[[id number]],[[id number]] [[id number]],[[id number]],[[id number]]

## 2022-08-13 NOTE — H&P ADULT - NSHPREVIEWOFSYSTEMS_GEN_ALL_CORE
CONSTITUTIONAL: No fever, weight loss, or fatigue  RESPIRATORY: No cough, wheezing, chills or hemoptysis; No shortness of breath  CARDIOVASCULAR: No chest pain, palpitations, dizziness, or leg swelling  GASTROINTESTINAL: No abdominal pain. No nausea, vomiting, or hematemesis; No diarrhea or constipation. No melena or hematochezia.  GENITOURINARY: No dysuria or hematuria, urinary frequency  NEUROLOGICAL: No headaches, memory loss, loss of strength, numbness, or tremors  ENDOCRINE: No polyuria, polydipsia, or heat/cold intolerance  MUSKULOSKELETAL: No muscle aches, joint pains  HEME: no easy bruisability, no tender or enlarged lymph nodes  SKIN: No itching, burning, rashes, or lesions . CONSTITUTIONAL: No fever, weight loss, or fatigue  RESPIRATORY: Shortness of breath.   CARDIOVASCULAR: No chest pain, palpitations, dizziness, or leg swelling  GASTROINTESTINAL: Melena  GENITOURINARY: No dysuria or hematuria, urinary frequency  NEUROLOGICAL: No headaches, memory loss, loss of strength, numbness, or tremors  ENDOCRINE: No polyuria, polydipsia, or heat/cold intolerance  MUSKULOSKELETAL: No muscle aches, joint pains  HEME: no easy bruisability, no tender or enlarged lymph nodes  SKIN: No itching, burning, rashes, or lesions .

## 2022-08-13 NOTE — DISCHARGE NOTE PROVIDER - NSDCCPCAREPLAN_GEN_ALL_CORE_FT
PRINCIPAL DISCHARGE DIAGNOSIS  Diagnosis: GI bleed  Assessment and Plan of Treatment: You came to the hospital with black stools.  You had no bowel movements while you were in the hospital.  Your hemoglobin (blood count) was normal at 13.4 (it was repeated and was 13.3).  Your vital signs were normal throughout your hospitalization.  You may have a stomach ulcer that is oozing blood.  This can be further evaluated as an outpatient and you should follow-up with a Gastroenterologist.  You were started on a medicine to reduce stomach acid and reduce risk of a bleeding ulcer.  The medication is called protonix 40mg and should be taken twice a day until you are evaluated by a Gastroenterologist.  -Avoid spicy and greasy/oily foods. Avoid alcohol and NSAID (non-steroidal anti inflammatory) pain killers until you are evaluated by a Gastroenterologist.      SECONDARY DISCHARGE DIAGNOSES  Diagnosis: High serum cholestanol  Assessment and Plan of Treatment: Your cholesterol is elevated.  Total cholesterol was 170, LDL (also known as bad cholesterol) was 170.  HDL (good cholesterol) was 40.  Triglycerides were 117.  You should evaluate your diet and try to eat a diet that is low in fat.  No medication was prescribed for your high cholesterol and you should follow-up with your primary care provider for further care.     PRINCIPAL DISCHARGE DIAGNOSIS  Diagnosis: GI bleed  Assessment and Plan of Treatment: You came to the hospital with black stools reportedly over the past 2 months. Your hemoglobin (blood count) was normal at 13.4 (it was repeated and was 13.3).  Your vital signs were normal throughout your hospitalization. You had no bowel movements while you were in the hospital suggesting no active ongoing bleeding at this time.    You may have a stomach ulcer that was oozing blood intermittenty.  You was arequested to be seen by GI but given the weekend and stable blood count you would not want to wait until Monday for an endoscopy.   This can be further evaluated as an outpatient and you should follow-up with a Gastroenterologist for which information has been provided.  You were started on a medicine to reduce stomach acid and reduce risk of a bleeding ulcer.  The medication is called Pantoprazole (protonix)40mg and should be taken twice a day once before breakfast and once before dinner until you are evaluated by a Gastroenterologist and has an endoscopy.  You reported a normal Colonoscopy last year.   -Avoid spicy and greasy/oily foods.   Avoid alcohol and NSAID (non-steroidal anti inflammatory) pain killers until you are evaluated by a Gastroenterologist.      SECONDARY DISCHARGE DIAGNOSES  Diagnosis: Hyperlipidemia, unspecified  Assessment and Plan of Treatment: We did a Lipid profile showing elevated cholesterol and LDL levels.   Your cholesterol is elevated.  Total cholesterol was 170, LDL (also known as bad cholesterol) was 170.  HDL (good cholesterol) was 40.  Triglycerides were 117.  You should evaluate your diet and try to eat a diet that is low in fat.    You do not need any medications at this time. Lifestyle modifications with low fat diet, Exercise 30 mins daily at least 3 to 5 days a week and weight loss  Follow up with your PCP to get a repeat blood work in 6 to 8 weeks with lifestyle modifications    Diagnosis: Hypothyroid  Assessment and Plan of Treatment: You have history of low thyroid function on Synthroid at home. Please continue to take Synthroid emtpy stomach 30 mins before breakfast without any other medications. Follow up with your PCP and adjust dose as per TSH every 3 to 6 months.    Diagnosis: History of depression  Assessment and Plan of Treatment: You are on Serttraline (Zoloft) at home. Continue to take home dose 100 mg daily. Follow up with your Psychiatrist

## 2022-08-13 NOTE — DISCHARGE NOTE PROVIDER - CARE PROVIDER_API CALL
Chico Hager)  Gastroenterology; Internal Medicine  [address] [facility], [address] [place] [address]  Phone: [phone]  Fax: [phone]  Follow Up Time: 2 weeks    Robert Uriostegui)  Gastroenterology  [address] [facility] [facility] [place] [address]  Phone: [phone]  Fax: [phone]  Follow Up Time: 2 weeks   Chico Hager)  Gastroenterology; Internal Medicine  [address] [facility], [facility] [place] [address]  Phone: [phone]  Fax: [phone]  Follow Up Time: 2 weeks    Robert Uriostegui)  Gastroenterology  [address] [facility], [address] [place] [address], [place] [address]  Phone: [phone]  Fax: [phone]  Follow Up Time: 2 weeks    Stewart Granado)  Medicine  [address] [facility] [address] [place] [address]  Phone: [phone]  Fax: [phone]  Follow Up Time:    Chico Hager)  Gastroenterology; Internal Medicine  [address] [facility], [address] [place] [address]  Phone: [phone]  Fax: [phone]  Follow Up Time: 2 weeks    Robert Uriostegui)  Gastroenterology  [address] [facility], [address], [place] [address]  Phone: [phone]  Fax: [phone]  Follow Up Time: 2 weeks    PRASHANT MOSCOSO  Family Medicine  [address] [place] [address]  Phone: [phone]  Fax: ()-  Follow Up Time: 1 week   PRASHANT MOSCOSO  Family Medicine  [address] [facility] [place] [address]  Phone: [phone]  Fax: ()-  Follow Up Time: 1 week    Stewart Granado)  Medicine  [address] [place] [address]  Phone: [phone]  Fax: [phone]  Follow Up Time: 1 week    Chico Hager)  Gastroenterology; Internal Medicine  [address] [facility] [place] [address]  Phone: [phone]  Fax: [phone]  Follow Up Time: 2 weeks

## 2022-08-13 NOTE — DISCHARGE NOTE PROVIDER - NSDCMRMEDTOKEN_GEN_ALL_CORE_FT
LEVOTHYROXINE 50 MCG TABLET: TAKE 1 TABLET BY MOUTH EVERY DAY IN THE MORNING ON EMPTY STOMACH FOR 90 DAYS  pantoprazole 40 mg oral delayed release tablet: 1 tab(s) orally 2 times a day   SERTRALINE  MG TABLET: TAKE 2 TABLETS BY MOUTH EVERY DAY

## 2022-08-13 NOTE — DISCHARGE NOTE NURSING/CASE MANAGEMENT/SOCIAL WORK - PATIENT PORTAL LINK FT
You can access the FollowMyHealth Patient Portal offered by [facility] by registering at the following website: [email or web address]. By joining [facility]’s FollowMyHealth portal, you will also be able to view your health information using other applications (apps) compatible with our system.

## 2022-08-13 NOTE — DISCHARGE NOTE PROVIDER - PROVIDER TOKENS
PROVIDER:[TOKEN:[[id number]],FOLLOWUP:[2 weeks]],PROVIDER:[TOKEN:[[id number]],FOLLOWUP:[2 weeks]] PROVIDER:[TOKEN:[[id number]],FOLLOWUP:[2 weeks]],PROVIDER:[TOKEN:[[id number]],FOLLOWUP:[2 weeks]],PROVIDER:[TOKEN:[[id number]]] PROVIDER:[TOKEN:[[id number]],FOLLOWUP:[2 weeks]],PROVIDER:[TOKEN:[[id number]],FOLLOWUP:[2 weeks]],PROVIDER:[TOKEN:[[id number]],FOLLOWUP:[1 week]] PROVIDER:[TOKEN:[[id number]],FOLLOWUP:[1 week]],PROVIDER:[TOKEN:[[id number]],FOLLOWUP:[1 week]],PROVIDER:[TOKEN:[[id number]],FOLLOWUP:[2 weeks]]

## 2022-08-13 NOTE — H&P ADULT - HISTORY OF PRESENT ILLNESS
This is a 56 year old female with medical history of hypothyroid coming in with concerns for GI bleed.  This is a 56 year old female with medical history of hypothyroid coming in with black stools for 3 weeks. She states she has been having black stool along with shortness of breath for the last 3 weeks. She was told by her PCP today to come to the ED. She states the black stool occur daily but not more than 2x a day. They are fully formed  mahendra. She also has been shortness of breath for similar amount of time. She denies any headaches, visual disturbances, N/V/D, dizziness, falls, chest pain, palpitations, lower extremity swelling, fevers, skin rash, recent travel, or sick contacts

## 2022-08-13 NOTE — DISCHARGE NOTE PROVIDER - ATTENDING DISCHARGE PHYSICAL EXAMINATION:
Middle aged female, no acute distress   Psych: AAO x3  Neuro: No gross focal deficits; Power and sensation intact  CVS: S1S2 present, regular, no edema  Resp: BLAE+, No wheeze or Rhonchi  GI: Soft, BS+, Non tender, non distended  Extr: No  calf tenderness B/L Lower extremities  Skin: Warm and moist without any rashes

## 2024-02-20 NOTE — ED PROVIDER NOTE - DISCHARGE DATE
30-Sep-2020 FAMILY HISTORY:  Aunt  Still living? No  Family history of CHF (congestive heart failure), Age at diagnosis: Age Unknown

## 2024-05-29 ENCOUNTER — APPOINTMENT (OUTPATIENT)
Dept: OBGYN | Facility: CLINIC | Age: 59
End: 2024-05-29
Payer: COMMERCIAL

## 2024-05-29 VITALS
TEMPERATURE: 95.3 F | HEIGHT: 63 IN | SYSTOLIC BLOOD PRESSURE: 140 MMHG | WEIGHT: 150 LBS | BODY MASS INDEX: 26.58 KG/M2 | OXYGEN SATURATION: 98 % | HEART RATE: 84 BPM | DIASTOLIC BLOOD PRESSURE: 91 MMHG

## 2024-05-29 DIAGNOSIS — Z78.9 OTHER SPECIFIED HEALTH STATUS: ICD-10-CM

## 2024-05-29 DIAGNOSIS — Z86.39 PERSONAL HISTORY OF OTHER ENDOCRINE, NUTRITIONAL AND METABOLIC DISEASE: ICD-10-CM

## 2024-05-29 DIAGNOSIS — N81.4 UTEROVAGINAL PROLAPSE, UNSPECIFIED: ICD-10-CM

## 2024-05-29 DIAGNOSIS — N95.2 POSTMENOPAUSAL ATROPHIC VAGINITIS: ICD-10-CM

## 2024-05-29 DIAGNOSIS — Z86.59 PERSONAL HISTORY OF OTHER MENTAL AND BEHAVIORAL DISORDERS: ICD-10-CM

## 2024-05-29 DIAGNOSIS — N81.5 VAGINAL ENTEROCELE: ICD-10-CM

## 2024-05-29 PROBLEM — Z00.00 ENCOUNTER FOR PREVENTIVE HEALTH EXAMINATION: Status: ACTIVE | Noted: 2024-05-29

## 2024-05-29 PROCEDURE — 99204 OFFICE O/P NEW MOD 45 MIN: CPT

## 2024-05-29 RX ORDER — ESTRADIOL 0.1 MG/G
0.1 CREAM VAGINAL
Qty: 1 | Refills: 2 | Status: ACTIVE | COMMUNITY
Start: 2024-05-29 | End: 1900-01-01

## 2024-05-29 RX ORDER — LEVOTHYROXINE SODIUM 25 UG/1
25 TABLET ORAL
Refills: 0 | Status: ACTIVE | COMMUNITY

## 2024-05-29 RX ORDER — SERTRALINE HYDROCHLORIDE 100 MG/1
100 TABLET, FILM COATED ORAL
Refills: 0 | Status: ACTIVE | COMMUNITY

## 2024-05-29 NOTE — HISTORY OF PRESENT ILLNESS
[Normal Amount/Duration] :  normal amount and duration [Regular Cycle Intervals] : periods have been regular [Menarche Age: ____] : age at menarche was [unfilled] [FreeTextEntry1] : 10yrs [Currently Active] : currently active [Men] : men [Vaginal] : vaginal [No] : No

## 2024-05-29 NOTE — PHYSICAL EXAM
[Chaperone Present] : A chaperone was present in the examining room during all aspects of the physical examination [FreeTextEntry2] : cristela mendes [Appropriately responsive] : appropriately responsive [Alert] : alert [No Acute Distress] : no acute distress [No Lymphadenopathy] : no lymphadenopathy [Regular Rate Rhythm] : regular rate rhythm [No Murmurs] : no murmurs [Clear to Auscultation B/L] : clear to auscultation bilaterally [Soft] : soft [Non-tender] : non-tender [Non-distended] : non-distended [No HSM] : No HSM [No Lesions] : no lesions [No Mass] : no mass [Oriented x3] : oriented x3 [Examination Of The Breasts] : a normal appearance [Normal] : normal [No Masses] : no breast masses were palpable [No Bleeding] : There was no active vaginal bleeding [Absent] : absent [FreeTextEntry4] : enterocele present

## 2024-05-30 LAB
APPEARANCE: ABNORMAL
BACTERIA: NEGATIVE /HPF
BILIRUBIN URINE: NEGATIVE
BLOOD URINE: NEGATIVE
CALCIUM OXALATE CRYSTALS: PRESENT
CAST: 0 /LPF
COLOR: YELLOW
EPITHELIAL CELLS: 0 /HPF
GLUCOSE QUALITATIVE U: NEGATIVE MG/DL
HPV 16 E6+E7 MRNA CVX QL NAA+PROBE: NOT DETECTED
HPV18+45 E6+E7 MRNA CVX QL NAA+PROBE: NOT DETECTED
KETONES URINE: NEGATIVE MG/DL
LEUKOCYTE ESTERASE URINE: NEGATIVE
MICROSCOPIC-UA: NORMAL
NITRITE URINE: NEGATIVE
PH URINE: 7.5
PROTEIN URINE: NEGATIVE MG/DL
RED BLOOD CELLS URINE: 3 /HPF
REVIEW: NORMAL
SPECIFIC GRAVITY URINE: 1.02
UROBILINOGEN URINE: 0.2 MG/DL
WHITE BLOOD CELLS URINE: 0 /HPF

## 2024-05-31 LAB — BACTERIA UR CULT: NORMAL

## 2024-06-03 LAB — CYTOLOGY CVX/VAG DOC THIN PREP: NORMAL

## 2024-06-10 ENCOUNTER — APPOINTMENT (OUTPATIENT)
Dept: MAMMOGRAPHY | Facility: CLINIC | Age: 59
End: 2024-06-10
Payer: COMMERCIAL

## 2024-06-10 ENCOUNTER — APPOINTMENT (OUTPATIENT)
Dept: UROLOGY | Facility: CLINIC | Age: 59
End: 2024-06-10
Payer: COMMERCIAL

## 2024-06-10 VITALS
SYSTOLIC BLOOD PRESSURE: 137 MMHG | HEART RATE: 83 BPM | TEMPERATURE: 96.8 F | DIASTOLIC BLOOD PRESSURE: 68 MMHG | OXYGEN SATURATION: 96 %

## 2024-06-10 DIAGNOSIS — N81.6 RECTOCELE: ICD-10-CM

## 2024-06-10 DIAGNOSIS — N32.81 OVERACTIVE BLADDER: ICD-10-CM

## 2024-06-10 PROCEDURE — 51798 US URINE CAPACITY MEASURE: CPT

## 2024-06-10 PROCEDURE — 99204 OFFICE O/P NEW MOD 45 MIN: CPT | Mod: 25

## 2024-06-10 PROCEDURE — G2211 COMPLEX E/M VISIT ADD ON: CPT | Mod: NC

## 2024-06-10 NOTE — PHYSICAL EXAM
[General Appearance - Well Developed] : well developed [General Appearance - Well Nourished] : well nourished [] : no respiratory distress [Abdomen Soft] : soft [Abdomen Tenderness] : non-tender [Urethral Meatus] : the meatus of the urethra showed no abnormalities [Normal Station and Gait] : the gait and station were normal for the patient's age [No Focal Deficits] : no focal deficits [FreeTextEntry1] : Chaperone: HANNAH Kuhn  [de-identified] : stage II prolapse: Ba: -4, C: -4, Bp: 0, no pulsion, PVR: 0

## 2024-06-10 NOTE — HISTORY OF PRESENT ILLNESS
[Urinary Incontinence] : urinary incontinence [Urinary Urgency] : urinary urgency [FreeTextEntry1] : 58F presents for initial evaluation of prolapse, mixed incontinence  Referred by Dr. Garland  PMH significant for: depression, hypothyroid   PSH significant for: hysterectomy  Significant meds: estradiol, levothyroxine, sertraline   Patient reports years-long history of prolapse symptoms  Reports moderate level of distress  Pelvic Heaviness: yes Vaginal Bulge: yes Splinting: yes Associated with: nothing Previously treated with: nothing, hysterectomy for prolapse in 2014  Patient reports years-long history of urgency, urge incontinence Reports moderate level of distress  Associated with nothing Previously treated with nothing  Daytime Frequency: 6-7 Nighttime Frequency: 1 Pads per day: 0 Straining to void: no  Subjective Incomplete Emptying: no PVR 0cc  Daily Fluid Total: 3 bottles Daily Caffeine Total: 1 cup    Fecal Incontinence: no  Neurologic Hx, Vision or Balance changes: no

## 2024-06-10 NOTE — ASSESSMENT
[FreeTextEntry1] :  Ms. BARAJAS has seen me today for an evaluation of II pelvic organ prolapse.   Ba: -4, C: -4, Bp: 0 No occult ALCON expressed, PVR: 0  We have extensively discussed conservative and surgical options including:          -Observation        -Pessary        -Transvaginal native tissue repair        -Transabdominal sacrocolpopexy with mesh   Surgical risks of prolapse repair include but are not limited to bleeding requiring transfusion, infection, injury to the bowel, bladder, and neurovasculature.  Sacrocolpopexy has increased risks of sacral pain at the mesh attachment site and vaginal mesh erosion. Sacrospinous vaginal vault suspension has increased risks of buttock soreness and pudendal nerve neuropraxia. The patient understands that no prolapse repair is 100% durable and that recurrence occurs in 30-40% of cases at 5 years with a vaginal approach and 15% with an abdominal one.   The patient would like to proceed with: [facility]  preop UDS UCx I will arrange for the procedure. Literature provided.

## 2024-06-11 ENCOUNTER — RESULT REVIEW (OUTPATIENT)
Age: 59
End: 2024-06-11

## 2024-06-11 PROCEDURE — 77063 BREAST TOMOSYNTHESIS BI: CPT

## 2024-06-11 PROCEDURE — 77067 SCR MAMMO BI INCL CAD: CPT

## 2024-06-12 ENCOUNTER — NON-APPOINTMENT (OUTPATIENT)
Age: 59
End: 2024-06-12

## 2024-06-12 DIAGNOSIS — R92.8 OTHER ABNORMAL AND INCONCLUSIVE FINDINGS ON DIAGNOSTIC IMAGING OF BREAST: ICD-10-CM

## 2024-06-12 LAB — BACTERIA UR CULT: NORMAL

## 2024-06-24 ENCOUNTER — APPOINTMENT (OUTPATIENT)
Dept: UROLOGY | Facility: CLINIC | Age: 59
End: 2024-06-24
Payer: COMMERCIAL

## 2024-06-24 VITALS
TEMPERATURE: 97.5 F | DIASTOLIC BLOOD PRESSURE: 81 MMHG | HEIGHT: 63 IN | WEIGHT: 150 LBS | SYSTOLIC BLOOD PRESSURE: 153 MMHG | BODY MASS INDEX: 26.58 KG/M2 | OXYGEN SATURATION: 98 % | HEART RATE: 74 BPM

## 2024-06-24 DIAGNOSIS — N99.3 PROLAPSE OF VAGINAL VAULT AFTER HYSTERECTOMY: ICD-10-CM

## 2024-06-24 PROCEDURE — 51741 ELECTRO-UROFLOWMETRY FIRST: CPT

## 2024-06-24 PROCEDURE — 51797 INTRAABDOMINAL PRESSURE TEST: CPT

## 2024-06-24 PROCEDURE — 51728 CYSTOMETROGRAM W/VP: CPT

## 2024-06-24 PROCEDURE — 51784 ANAL/URINARY MUSCLE STUDY: CPT

## 2024-07-23 PROBLEM — Z78.9 OTHER SPECIFIED HEALTH STATUS: Chronic | Status: INACTIVE | Noted: 2020-09-29 | Resolved: 2024-07-23

## 2024-07-23 RX ORDER — BACTERIOSTATIC SODIUM CHLORIDE 0.9 %
3 VIAL (ML) INJECTION EVERY 8 HOURS
Refills: 0 | Status: DISCONTINUED | OUTPATIENT
Start: 2024-08-07 | End: 2024-08-08

## 2024-08-06 ENCOUNTER — TRANSCRIPTION ENCOUNTER (OUTPATIENT)
Age: 59
End: 2024-08-06

## 2024-08-07 ENCOUNTER — INPATIENT (INPATIENT)
Facility: HOSPITAL | Age: 59
LOS: 0 days | Discharge: HOME | DRG: 748 | End: 2024-08-08
Attending: STUDENT IN AN ORGANIZED HEALTH CARE EDUCATION/TRAINING PROGRAM | Admitting: STUDENT IN AN ORGANIZED HEALTH CARE EDUCATION/TRAINING PROGRAM
Payer: COMMERCIAL

## 2024-08-07 ENCOUNTER — APPOINTMENT (OUTPATIENT)
Dept: UROLOGY | Facility: HOSPITAL | Age: 59
End: 2024-08-07

## 2024-08-07 VITALS
RESPIRATION RATE: 16 BRPM | TEMPERATURE: 98 F | WEIGHT: 147.93 LBS | HEIGHT: 61.5 IN | DIASTOLIC BLOOD PRESSURE: 82 MMHG | SYSTOLIC BLOOD PRESSURE: 132 MMHG | OXYGEN SATURATION: 97 % | HEART RATE: 89 BPM

## 2024-08-07 DIAGNOSIS — Z90.710 ACQUIRED ABSENCE OF BOTH CERVIX AND UTERUS: Chronic | ICD-10-CM

## 2024-08-07 DIAGNOSIS — N99.3 PROLAPSE OF VAGINAL VAULT AFTER HYSTERECTOMY: ICD-10-CM

## 2024-08-07 DIAGNOSIS — N81.6 RECTOCELE: ICD-10-CM

## 2024-08-07 LAB — BLD GP AB SCN SERPL QL: SIGNIFICANT CHANGE UP

## 2024-08-07 DEVICE — MESH UPSYLON Y: Type: IMPLANTABLE DEVICE | Status: FUNCTIONAL

## 2024-08-07 RX ORDER — METOCLOPRAMIDE HCL 5 MG/ML
10 VIAL (ML) INJECTION ONCE
Refills: 0 | Status: DISCONTINUED | OUTPATIENT
Start: 2024-08-07 | End: 2024-08-07

## 2024-08-07 RX ORDER — SENNOSIDES 8.6 MG/1
2 TABLET ORAL AT BEDTIME
Refills: 0 | Status: DISCONTINUED | OUTPATIENT
Start: 2024-08-07 | End: 2024-08-08

## 2024-08-07 RX ORDER — ACETAMINOPHEN 500 MG
1000 TABLET ORAL ONCE
Refills: 0 | Status: DISCONTINUED | OUTPATIENT
Start: 2024-08-07 | End: 2024-08-07

## 2024-08-07 RX ORDER — FENTANYL CITRATE 1200 UG/1
25 LOZENGE ORAL; TRANSMUCOSAL
Refills: 0 | Status: DISCONTINUED | OUTPATIENT
Start: 2024-08-07 | End: 2024-08-07

## 2024-08-07 RX ORDER — FENTANYL CITRATE 1200 UG/1
50 LOZENGE ORAL; TRANSMUCOSAL
Refills: 0 | Status: DISCONTINUED | OUTPATIENT
Start: 2024-08-07 | End: 2024-08-07

## 2024-08-07 RX ORDER — OXYCODONE HYDROCHLORIDE 30 MG/1
5 TABLET ORAL EVERY 6 HOURS
Refills: 0 | Status: DISCONTINUED | OUTPATIENT
Start: 2024-08-07 | End: 2024-08-08

## 2024-08-07 RX ORDER — DEXTROSE MONOHYDRATE, SODIUM CHLORIDE, SODIUM LACTATE, CALCIUM CHLORIDE, MAGNESIUM CHLORIDE 1.5; 538; 448; 18.4; 5.08 G/100ML; MG/100ML; MG/100ML; MG/100ML; MG/100ML
1000 SOLUTION INTRAPERITONEAL
Refills: 0 | Status: DISCONTINUED | OUTPATIENT
Start: 2024-08-07 | End: 2024-08-08

## 2024-08-07 RX ORDER — ACETAMINOPHEN 500 MG
975 TABLET ORAL EVERY 6 HOURS
Refills: 0 | Status: DISCONTINUED | OUTPATIENT
Start: 2024-08-07 | End: 2024-08-08

## 2024-08-07 RX ORDER — HEPARIN SODIUM 1000 [USP'U]/ML
5000 INJECTION, SOLUTION INTRAVENOUS; SUBCUTANEOUS EVERY 8 HOURS
Refills: 0 | Status: DISCONTINUED | OUTPATIENT
Start: 2024-08-07 | End: 2024-08-08

## 2024-08-07 RX ADMIN — Medication 975 MILLIGRAM(S): at 19:08

## 2024-08-07 RX ADMIN — Medication 3 MILLILITER(S): at 22:00

## 2024-08-07 RX ADMIN — Medication 975 MILLIGRAM(S): at 18:33

## 2024-08-07 RX ADMIN — DEXTROSE MONOHYDRATE, SODIUM CHLORIDE, SODIUM LACTATE, CALCIUM CHLORIDE, MAGNESIUM CHLORIDE 125 MILLILITER(S): 1.5; 538; 448; 18.4; 5.08 SOLUTION INTRAPERITONEAL at 22:00

## 2024-08-07 RX ADMIN — Medication 3 MILLILITER(S): at 06:50

## 2024-08-07 NOTE — PATIENT PROFILE ADULT - FUNCTIONAL ASSESSMENT - DAILY ACTIVITY SECTION LABEL
Per fax there were no changes to med, dosage, sig or quantity.   The medication(s) set up as pending and waiting for your approval.   Preferred Pharmacy has been set up and verified      .

## 2024-08-07 NOTE — PATIENT PROFILE ADULT - FALL HARM RISK - UNIVERSAL INTERVENTIONS
Bed in lowest position, wheels locked, appropriate side rails in place/Call bell, personal items and telephone in reach/Instruct patient to call for assistance before getting out of bed or chair/Non-slip footwear when patient is out of bed/[place] to call system/Physically safe environment - no spills, clutter or unnecessary equipment/Purposeful Proactive Rounding/Room/bathroom lighting operational, light cord in reach

## 2024-08-08 ENCOUNTER — TRANSCRIPTION ENCOUNTER (OUTPATIENT)
Age: 59
End: 2024-08-08

## 2024-08-08 VITALS
DIASTOLIC BLOOD PRESSURE: 72 MMHG | TEMPERATURE: 98 F | HEART RATE: 79 BPM | SYSTOLIC BLOOD PRESSURE: 127 MMHG | RESPIRATION RATE: 18 BRPM | OXYGEN SATURATION: 96 %

## 2024-08-08 LAB
ANION GAP SERPL CALC-SCNC: 3 MMOL/L — LOW (ref 5–17)
BUN SERPL-MCNC: 11 MG/DL — SIGNIFICANT CHANGE UP (ref 7–18)
CALCIUM SERPL-MCNC: 8.6 MG/DL — SIGNIFICANT CHANGE UP (ref 8.4–10.5)
CHLORIDE SERPL-SCNC: 111 MMOL/L — HIGH (ref 96–108)
CO2 SERPL-SCNC: 29 MMOL/L — SIGNIFICANT CHANGE UP (ref 22–31)
CREAT SERPL-MCNC: 0.91 MG/DL — SIGNIFICANT CHANGE UP (ref 0.5–1.3)
EGFR: 74 ML/MIN/1.73M2 — SIGNIFICANT CHANGE UP
GLUCOSE SERPL-MCNC: 98 MG/DL — SIGNIFICANT CHANGE UP (ref 70–99)
HCT VFR BLD CALC: 39.1 % — SIGNIFICANT CHANGE UP (ref 34.5–45)
HGB BLD-MCNC: 12.7 G/DL — SIGNIFICANT CHANGE UP (ref 11.5–15.5)
MCHC RBC-ENTMCNC: 29.8 PG — SIGNIFICANT CHANGE UP (ref 27–34)
MCHC RBC-ENTMCNC: 32.5 GM/DL — SIGNIFICANT CHANGE UP (ref 32–36)
MCV RBC AUTO: 91.8 FL — SIGNIFICANT CHANGE UP (ref 80–100)
NRBC # BLD: 0 /100 WBCS — SIGNIFICANT CHANGE UP (ref 0–0)
PLATELET # BLD AUTO: 164 K/UL — SIGNIFICANT CHANGE UP (ref 150–400)
POTASSIUM SERPL-MCNC: 3.6 MMOL/L — SIGNIFICANT CHANGE UP (ref 3.5–5.3)
POTASSIUM SERPL-SCNC: 3.6 MMOL/L — SIGNIFICANT CHANGE UP (ref 3.5–5.3)
RBC # BLD: 4.26 M/UL — SIGNIFICANT CHANGE UP (ref 3.8–5.2)
RBC # FLD: 13.2 % — SIGNIFICANT CHANGE UP (ref 10.3–14.5)
SODIUM SERPL-SCNC: 143 MMOL/L — SIGNIFICANT CHANGE UP (ref 135–145)
WBC # BLD: 8.27 K/UL — SIGNIFICANT CHANGE UP (ref 3.8–10.5)
WBC # FLD AUTO: 8.27 K/UL — SIGNIFICANT CHANGE UP (ref 3.8–10.5)

## 2024-08-08 PROCEDURE — 86901 BLOOD TYPING SEROLOGIC RH(D): CPT

## 2024-08-08 PROCEDURE — 85027 COMPLETE CBC AUTOMATED: CPT

## 2024-08-08 PROCEDURE — 86850 RBC ANTIBODY SCREEN: CPT

## 2024-08-08 PROCEDURE — C1781: CPT

## 2024-08-08 PROCEDURE — 86900 BLOOD TYPING SEROLOGIC ABO: CPT

## 2024-08-08 PROCEDURE — 80048 BASIC METABOLIC PNL TOTAL CA: CPT

## 2024-08-08 PROCEDURE — 36415 COLL VENOUS BLD VENIPUNCTURE: CPT

## 2024-08-08 RX ORDER — SERTRALINE HYDROCHLORIDE 100 MG/1
100 TABLET, FILM COATED ORAL DAILY
Refills: 0 | Status: DISCONTINUED | OUTPATIENT
Start: 2024-08-08 | End: 2024-08-08

## 2024-08-08 RX ORDER — DOCUSATE SODIUM 50 MG/5ML
1 LIQUID ORAL
Qty: 6 | Refills: 0
Start: 2024-08-08 | End: 2024-08-09

## 2024-08-08 RX ORDER — OXYCODONE HYDROCHLORIDE 30 MG/1
1 TABLET ORAL
Qty: 6 | Refills: 0
Start: 2024-08-08

## 2024-08-08 RX ORDER — LEVOTHYROXINE SODIUM 175 MCG
50 TABLET ORAL DAILY
Refills: 0 | Status: DISCONTINUED | OUTPATIENT
Start: 2024-08-08 | End: 2024-08-08

## 2024-08-08 RX ADMIN — Medication 3 MILLILITER(S): at 05:31

## 2024-08-08 NOTE — DISCHARGE NOTE PROVIDER - CARE PROVIDER_API CALL
Iván Mchugh  Urology  [address] [facility]  [place] [address]  Phone: [phone]  Fax: [phone]  Follow Up Time: 2 weeks   Iván Mchugh  Urology  [address] [facility], [address]  [place] [address]  Phone: [phone]  Fax: [phone]  Follow Up Time: 1 month

## 2024-08-08 NOTE — DISCHARGE NOTE NURSING/CASE MANAGEMENT/SOCIAL WORK - PATIENT PORTAL LINK FT
You can access the FollowMyHealth Patient Portal offered by [facility] by registering at the following website: [email or web address]. By joining [employer]’s FollowMyHealth portal, you will also be able to view your health information using other applications (apps) compatible with our system.

## 2024-08-08 NOTE — DISCHARGE NOTE PROVIDER - PROVIDER TOKENS
PROVIDER:[TOKEN:[[id number]],FOLLOWUP:[2 weeks]] PROVIDER:[TOKEN:[[id number]],FOLLOWUP:[1 month]]

## 2024-08-08 NOTE — DISCHARGE NOTE PROVIDER - NSDCMRMEDTOKEN_GEN_ALL_CORE_FT
LEVOTHYROXINE 50 MCG TABLET: TAKE 1 TABLET BY MOUTH EVERY DAY IN THE MORNING ON EMPTY STOMACH FOR 90 DAYS  montelukast 10 mg oral tablet: 1 tab(s) orally once a day  Multiple Vitamins oral tablet: 1 tab(s) orally once a day  sertraline 100 mg oral tablet: 1 tab(s) orally once a day   Colace 100 mg oral capsule: 1 cap(s) orally every 8 hours as needed for  constipation  LEVOTHYROXINE 50 MCG TABLET: TAKE 1 TABLET BY MOUTH EVERY DAY IN THE MORNING ON EMPTY STOMACH FOR 90 DAYS  montelukast 10 mg oral tablet: 1 tab(s) orally once a day  Multiple Vitamins oral tablet: 1 tab(s) orally once a day  oxyCODONE 5 mg oral tablet: 1 tab(s) orally every 6 hours as needed for  severe pain MDD: 4  sertraline 100 mg oral tablet: 1 tab(s) orally once a day

## 2024-08-08 NOTE — DISCHARGE NOTE PROVIDER - NSDCCPCAREPLAN_GEN_ALL_CORE_FT
PRINCIPAL DISCHARGE DIAGNOSIS  Diagnosis: Vaginal prolapse  Assessment and Plan of Treatment: Please follow-up with your surgeon in 1 week. Drink plenty of fluids and rest as needed. Call for any fever over 101, nausea, vomiting, severe pain, no passing of urine or if passing dark red blood with clots  DIET  You may resume your regular diet as normal.   SURGICAL SITES  You may shower but do not bathe or soak in the water for 1-2 weeks; pat dry. If you notice any signs of surgical site infection (ie. redness, swelling, pain, pus drainage), please seek medical care immediately.   ACTIVITY  Do not lift anything heavier than 10 pounds for 2 weeks and avoid strenuous activity for 4-6 weeks.   PAIN CONTROL  You may take Motrin 600mg (with food) or Tylenol 650mg as needed for mild pain. Stagger one medication 3 hours after the other for maximum pain control. Maximum daily dose of Tylenol should not exceed 4grams/day.     PRINCIPAL DISCHARGE DIAGNOSIS  Diagnosis: Vaginal prolapse  Assessment and Plan of Treatment: Please follow-up with your surgeon in 1 week. Drink plenty of fluids and rest as needed. Call for any fever over 101, nausea, vomiting, severe pain, no passing of urine or if passing dark red blood with clots  DIET  You may resume your regular diet as normal.   SURGICAL SITES  You may shower but do not bathe or soak in the water for 1-2 weeks; pat dry. If you notice any signs of surgical site infection (ie. redness, swelling, pain, pus drainage), please seek medical care immediately.   ACTIVITY  Do not lift anything heavier than 10 pounds for 2 weeks and avoid strenuous activity for 4-6 weeks.   PAIN CONTROL  You may take Motrin 600mg (with food) or Tylenol 650mg as needed for mild pain. Stagger one medication 3 hours after the other for maximum pain control. Maximum daily dose of Tylenol should not exceed 4grams/day. Take prescribed oxycodone for severe breakthrough pain as needed. IF taking oxycodone you may use prescribed colace for constipation as needed.

## 2024-08-08 NOTE — DISCHARGE NOTE PROVIDER - HOSPITAL COURSE
57 yo female with history of Hypothyroidism, Depression, Seasonal Allergies. Patient states has felt something soft hanging through her vagina for approximately one year. She states that it bothers her so much, that she wants to have to have this surgery. Patient presented for elective, scheduled Robotic Assisted Laparoscopic Sacrocolpopexy on 8/7/24. She was admitted postoperatively for monitoring. Her diet was advanced as tolerated when she was passing flatus. Quiroz catheter removed on POD#1.   Cleared for discharge when voiding, labs, vitals, stable

## 2024-08-08 NOTE — PROGRESS NOTE ADULT - ASSESSMENT
56 year old female s/p robot sacrocolpopexy POD#1    - haq catheter out for TOV  - diet advanced to regular  - resumed home medications  - discharge planning when tolerating a regular diet   - discuss with Dr. Mchugh

## 2024-08-08 NOTE — PROGRESS NOTE ADULT - SUBJECTIVE AND OBJECTIVE BOX
s/p robot sacrocolpopexy POD#1  Patient seen and examined at bedside with no complaints.   Admits to flatus  Denies nausea/ vomiting.   Tolerating clear liquid diet.  Quiroz removed by attending prior to exam - awaiting void    Vital Signs Last 24 Hrs  T(F): 98.3 (08-08-24 @ 04:46), Max: 98.7 (08-07-24 @ 20:29)  HR: 79 (08-08-24 @ 04:46)  BP: 127/72 (08-08-24 @ 04:46)  RR: 18 (08-08-24 @ 04:46)  SpO2: 96% (08-08-24 @ 04:46)    GENERAL: Alert, NAD  CHEST/LUNG: respirations nonlabored  ABDOMEN: surgical sites c/d/i, soft, Nontender, Nondistended  : no suprapubic tenderness  EXTREMITIES:  no calf tenderness, No edema    I&O's Detail    07 Aug 2024 07:01  -  08 Aug 2024 07:00  --------------------------------------------------------  IN:    Lactated Ringers Bolus: 1000 mL  Total IN: 1000 mL    OUT:    Indwelling Catheter - Urethral (mL): 2750 mL  Total OUT: 2750 mL    Total NET: -1750 mL    LABS:                        12.7   8.27  )-----------( 164      ( 08 Aug 2024 06:21 )             39.1     08-08    143  |  111<H>  |  11  ----------------------------<  98  3.6   |  29  |  0.91    Ca    8.6      08 Aug 2024 06:21  
no

## 2024-08-20 PROBLEM — F41.9 ANXIETY DISORDER, UNSPECIFIED: Chronic | Status: ACTIVE | Noted: 2024-07-23

## 2024-08-20 PROBLEM — J30.2 OTHER SEASONAL ALLERGIC RHINITIS: Chronic | Status: ACTIVE | Noted: 2024-07-23

## 2024-08-20 PROBLEM — E03.9 HYPOTHYROIDISM, UNSPECIFIED: Chronic | Status: ACTIVE | Noted: 2024-07-23

## 2024-09-09 ENCOUNTER — APPOINTMENT (OUTPATIENT)
Dept: UROLOGY | Facility: CLINIC | Age: 59
End: 2024-09-09
Payer: COMMERCIAL

## 2024-09-09 VITALS
SYSTOLIC BLOOD PRESSURE: 129 MMHG | HEIGHT: 63 IN | TEMPERATURE: 96.8 F | HEART RATE: 96 BPM | BODY MASS INDEX: 26.58 KG/M2 | OXYGEN SATURATION: 98 % | WEIGHT: 150 LBS | DIASTOLIC BLOOD PRESSURE: 87 MMHG

## 2024-09-09 DIAGNOSIS — N99.3 PROLAPSE OF VAGINAL VAULT AFTER HYSTERECTOMY: ICD-10-CM

## 2024-09-09 DIAGNOSIS — N81.6 RECTOCELE: ICD-10-CM

## 2024-09-09 DIAGNOSIS — N32.81 OVERACTIVE BLADDER: ICD-10-CM

## 2024-09-09 PROCEDURE — 99459 PELVIC EXAMINATION: CPT

## 2024-09-09 PROCEDURE — 99213 OFFICE O/P EST LOW 20 MIN: CPT | Mod: 25

## 2024-09-09 PROCEDURE — G2211 COMPLEX E/M VISIT ADD ON: CPT | Mod: NC

## 2024-09-09 PROCEDURE — 51798 US URINE CAPACITY MEASURE: CPT

## 2024-09-09 NOTE — ASSESSMENT
[FreeTextEntry1] :  Ms. BARAJAS has seen me today for postoperative evaluation  She is 1 mo s/p robotic Sacrocolpopexy  for stage II prolapse  Postoperative course has been uncomplicated  prolapse resolved. OAB improved PVR: 0  My recommendations are:        -resume all normal activities w/o restrictions        -RTC 3 mo

## 2024-09-09 NOTE — PHYSICAL EXAM
[General Appearance - Well Developed] : well developed [General Appearance - Well Nourished] : well nourished [] : no respiratory distress [Abdomen Soft] : soft [Abdomen Tenderness] : non-tender [de-identified] : inicions healing well [de-identified] : prolapse resolved, no blood or discharge in the vaginal vault [de-identified] : no skin pareathesias appreciated. able to differentiate dull vs. sharp touch  [Chaperone Present] : A chaperone was present in the examining room during all aspects of the physical examination [27409] : A chaperone was present during the pelvic exam. [FreeTextEntry2] : AYAAN Ramachandran

## 2024-09-09 NOTE — HISTORY OF PRESENT ILLNESS
[FreeTextEntry1] : 58F presents for follow up evaluation   h/o stage II pelvic organ prolapse  s/p RASC 8/7   Denies prolapse, incontinence, vaginal bleeding PVR: 0, emptying well Tolerating diet, normal BM  Reports non-specific numbness of her lower abdomen in the RLQ beneath the level of her incisions Associated with some mild itching  OAB symptoms improved

## (undated) DEVICE — XI SEAL UNIV 5- 8 MM

## (undated) DEVICE — TUBING STRYKEFLOW II SUCTION / IRRIGATOR

## (undated) DEVICE — TUBING TUR 2 PRONG

## (undated) DEVICE — XI DRAPE COLUMN

## (undated) DEVICE — Device

## (undated) DEVICE — SUT VICRYL 2-0 27" CT-2

## (undated) DEVICE — XI OBTURATOR OPTICAL BLADELESS 8MM

## (undated) DEVICE — TUBING AIRSEAL TRI-LUMEN FILTERED

## (undated) DEVICE — XI DRAPE ARM

## (undated) DEVICE — PACKING GAUZE 2" X 3YD

## (undated) DEVICE — SUT VLOC 180 3-0 12" V-20 GREEN

## (undated) DEVICE — DRSG MASTISOL

## (undated) DEVICE — XI TIP COVER

## (undated) DEVICE — SUT PROL 0  MO-6 1X30IN BLU

## (undated) DEVICE — XI VESSEL SEALER

## (undated) DEVICE — INSUFFLATION NDL COVIDIEN SURGINEEDLE VERESS 120MM

## (undated) DEVICE — TROCAR SURGIQUEST AIRSEAL 8MMX100MM

## (undated) DEVICE — SUT PDS II 2-0 27" CT-2

## (undated) DEVICE — SUT MONOCRYL 4-0 27" PS-2 UNDYED